# Patient Record
Sex: MALE | Race: WHITE | URBAN - METROPOLITAN AREA
[De-identification: names, ages, dates, MRNs, and addresses within clinical notes are randomized per-mention and may not be internally consistent; named-entity substitution may affect disease eponyms.]

---

## 2017-07-11 ENCOUNTER — OFFICE VISIT (OUTPATIENT)
Dept: DERMATOLOGY | Facility: CLINIC | Age: 27
End: 2017-07-11
Payer: COMMERCIAL

## 2017-07-11 DIAGNOSIS — Z80.8 FAMILY HISTORY OF MELANOMA: ICD-10-CM

## 2017-07-11 DIAGNOSIS — Z12.83 SKIN CANCER SCREENING: ICD-10-CM

## 2017-07-11 DIAGNOSIS — D22.9 MULTIPLE NEVI: ICD-10-CM

## 2017-07-11 DIAGNOSIS — L70.0 COMEDONAL ACNE: ICD-10-CM

## 2017-07-11 DIAGNOSIS — D48.5 NEOPLASM OF UNCERTAIN BEHAVIOR OF SKIN: Primary | ICD-10-CM

## 2017-07-11 DIAGNOSIS — L73.9 FOLLICULITIS: ICD-10-CM

## 2017-07-11 PROCEDURE — 88305 TISSUE EXAM BY PATHOLOGIST: CPT | Performed by: DERMATOLOGY

## 2017-07-11 PROCEDURE — 99203 OFFICE O/P NEW LOW 30 MIN: CPT | Mod: 25 | Performed by: DERMATOLOGY

## 2017-07-11 PROCEDURE — 11101 HC BIOPSY SKIN/SUBQ/MUC MEM, EACH ADDTL LESION: CPT | Performed by: DERMATOLOGY

## 2017-07-11 PROCEDURE — 11100 HC BIOPSY SKIN/SUBQ/MUC MEM, SINGLE LESION: CPT | Performed by: DERMATOLOGY

## 2017-07-11 RX ORDER — LIDOCAINE HYDROCHLORIDE AND EPINEPHRINE 10; 10 MG/ML; UG/ML
3 INJECTION, SOLUTION INFILTRATION; PERINEURAL ONCE
Qty: 0.5 ML | Refills: 0 | OUTPATIENT
Start: 2017-07-11 | End: 2017-07-11

## 2017-07-11 RX ORDER — BUPRENORPHINE HYDROCHLORIDE, NALOXONE HYDROCHLORIDE 8; 2 MG/1; MG/1
FILM, SOLUBLE BUCCAL; SUBLINGUAL
Refills: 0 | COMMUNITY
Start: 2017-06-14 | End: 2018-01-01

## 2017-07-11 NOTE — PROGRESS NOTES
"[facility] Dermatology Note      Dermatology Problem List:  1.Family hx of melanoma in pt's mother  2.NUB, right upper arm, shave bx 7/11/2017  3.NUB, right nasal ala, shave bx 7/11/2017  4.Folliculitis of the trunk, BPO 10% wash  5. Comedonal acne: face. BPO wash, Differin 0.1% gel qhs.    Last TBSE: 7/11/17    Encounter Date: Jul 11, 2017    CC:  Chief Complaint   Patient presents with     Derm Problem     mole check- arm, stomach and shoulder        History of Present Illness:  Mr. Robert Langley is a 27 year old male who presents for a mole check. He has moles of concern on his arm, abdomen, and shoulder. The one on his shoulder has not changed and is not pruritic, burning, or spontaneously bleeding. The other moles are not concerning him but he would like them removed for cosmetic reasons. The mole on his nose will gather \"grease and fat\" that he is able to squeeze out of it. There will be a hair that grows out of it and it is mildly pruritic. No other lesions of concern    He has used a soap bar in the shower but recently switched to a shower gel. He also recently purchased a loofa to better scrub his back.    He is currently recovering from a sun burn. He is not good with sun protection.    Past Medical History:   There is no problem list on file for this patient.    No past medical history on file.  No past surgical history on file.    Social History:  The patient works as an . The patient denies use of tanning beds.    Family History:  There is a family hx of melanoma in pt's mother.    Medications:  Current Outpatient Prescriptions   Medication Sig Dispense Refill     SUBOXONE 8-2 MG per film   0       No Known Allergies    Review of Systems:  -Skin/Heme New Pt: The patient admits to denies sun exposure. The patient denies excessive scarring or problems healing except as per HPI. The patient denies excessive bleeding.  -Constitutional: The patient denies fatigue, fevers, " chills, unintended weight loss, and night sweats.    Physical exam:  Vitals: There were no vitals taken for this visit.  GEN: This is a well developed, well-nourished male in no acute distress, in a pleasant mood.    NEURO: Alert and oriented  PSYCH: In pleasant mood, appropriate affect  SKIN: Total skin excluding the undergarment areas was performed. The exam included the head/face, neck, both arms, chest, back, abdomen, both legs, digits and/or nails.   -There are bright red some shaped papules scattered on examined surfaces.   -on the right upper arm is a pigmented papule with central asymmetric pigment and a peripheral clearing of pigment within the papule  -there is a firm skin colored papule on the right nasal ala measuring 3-4 mm in diameter  -Multiple regular brown pigmented macules and papules are identified on examined surfaces.   -Mild desquamation over the shoulders without redness.  -scattered open comedones mainly on forehead and across nose.  -No other lesions of concern on areas examined.     Impression/Plan:  1. Family history of Melanoma in mother/Skin cancer screening: aside form 2 biopsies below, no other skin concerns.    2. Neoplasm of uncertain behavior on the right upper arm. The differential diagnosis includes Dysplastic nevus vs nevus. Shave bx.  3. Neoplasm of uncertain behavior on the right nasal ala. The differential diagnosis includes nevus vs fibrous papule. Favor nevus. Shave bx.    x2 Shave biopsy:  After discussion of benefits and risks including but not limited to bleeding/bruising, pain/swelling, infection, scar, incomplete removal, nerve damage/numbness, recurrence, and non-diagnostic biopsy, written consent, verbal consent and photographs were obtained. Time-out was performed. The area was cleaned with isopropyl alcohol.  was injected to obtain adequate anesthesia of the lesion on the right upper arm and right nasal ala. 1 ml of 1% lidocaine with 1:100,000 epinephrine was  injected to obtain adequate anesthesia. A  shave biopsy was performed. Hemostasis was achieved with aluminium chloride. Vaseline and a sterile dressing were applied. The patient tolerated the procedure and no complications were noted. The patient was provided with verbal and written post care instructions.      4. Comedonal Acne of the face    OTC Differin 0.1% gel qhs taper up.    BPO 10% wash qoday.    5. Folliculitis on back. Mild.    Start OTC BPO 10% wash for the trunk, okay to use on face    6. Cherry angiomas and Multiple clinically benign nevi    No further intervention required. Patient to report changes.     Sun precaution was advised including the use of sun screens of SPF 30 or higher, sun protective clothing, and avoidance of tanning beds.      Follow-up prn for new or changing lesions, pending biopsy results. He doesn't need a yearly skin check yet.      Staff Involved:  Scribe/Staff    Scribe Disclosure:   I, Hebert Gurrola, am serving as a scribe to document services personally performed by Dr. Trent Aguilera, based on data collection and the provider's statements to me.     Provider Disclosure:   I have reviewed the documentation recorded by the scribe and have edited it as needed. I have personally performed the services documented here and the documentation accurately represents those services and the decisions made by me.     Trent Aguilera MD, MS    Department of Dermatology  [facility]: Phone: [phone], Fax:[phone]  [facility]: Phone: [phone], Fax: [phone]

## 2017-07-11 NOTE — PATIENT INSTRUCTIONS
Start over-the-counter benzoyl peroxide 10% wash on the Trunk.  If 10% is too irritating you can use the 5%. This medication can bleach your towels and clothing. This is easily found in the acne aisle. I probably would NOT use a deepa-like formulation as it is harder to use on the back. Do this every other day.    Differin (adapalene) Gel: start every 3rd night, increase by 1 night every week until you're using it every night as tolerated. This medication can cause dryness so make sure you moisturize regularly. Do not get it on your eyelids or in the eyes. This will help with the greasiness of the face. Takes 3 months to see a real difference. Use only at night. Dryness is the main side effect. You can use this 3 times a week if it is too drying for the face.    -----------------    Wound Care After a Biopsy    What is a skin biopsy?  A skin biopsy allows the doctor to examine a very small piece of tissue under the microscope to determine the diagnosis and the best treatment for the skin condition. A local anesthetic (numbing medicine)  is injected with a very small needle into the skin area to be tested. A small piece of skin is taken from the area. Sometimes a suture (stitch) is used.     What are the risks of a skin biopsy?  I will experience scar, bleeding, swelling, pain, crusting and redness. I may experience incomplete removal or recurrence. Risks of this procedure are excessive bleeding, bruising, infection, nerve damage, numbness, thick (hypertrophic or keloidal) scar and non-diagnostic biopsy.    How should I care for my wound for the first 24 hours?    Keep the wound dry and covered for 24 hours    If it bleeds, hold direct pressure on the area for 15 minutes. If bleeding does not stop then go to the emergency room    Avoid strenuous exercise the first 1-2 days or as your doctor instructs you    How should I care for the wound after 24 hours?    After 24 hours, remove the bandage    You may bathe or shower  as normal    If you had a scalp biopsy, you can shampoo as usual and can use shower water to clean the biopsy site daily    Clean the wound twice a day with gentle soap and water    Do not scrub, be gentle    Apply white petroleum/Vaseline after cleaning the wound with a cotton swab or a clean finger, and keep the site covered with a Bandaid /bandage. Bandages are not necessary with a scalp biopsy    If you are unable to cover the site with a Bandaid /bandage, re-apply ointment 2-3 times a day to keep the site moist. Moisture will help with healing    Avoid strenuous activity for first 1-2 days    Avoid lakes, rivers, pools, and oceans until the stitches are removed or the site is healed    How do I clean my wound?    Wash hands thoroughly with soap or use hand  before all wound care    Clean the wound with gentle soap and water    Apply white petroleum/Vaseline  to wound after it is clean    Replace the Bandaid /bandage to keep the wound covered for the first few days or as instructed by your doctor    If you had a scalp biopsy, warm shower water to the area on a daily basis should suffice    What should I use to clean my wound?     Cotton-tipped applicators (Qtips )    White petroleum jelly (Vaseline ). Use a clean new container and use Q-tips to apply.    Bandaids   as needed    Gentle soap     How should I care for my wound long term?    Do not get your wound dirty    Keep up with wound care for one week or until the area is healed.    A small scab will form and fall off by itself when the area is completely healed. The area will be red and will become pink in color as it heals. Sun protection is very important for how your scar will turn out. Sunscreen with an SPF 30 or greater is recommended once the area is healed.    You should have some soreness but it should be mild and slowly go away over several days. Talk to your doctor about using tylenol for pain,    When should I call my doctor?  If you have  increased:     Pain or swelling    Pus or drainage (clear or slightly yellow drainage is ok)    Temperature over 100F    Spreading redness or warmth around wound    When will I hear about my results?  The biopsy results can take 2-3 weeks to come back. The clinic will call you with the results, send you a [facility] message, or have you schedule a follow-up clinic or phone time to discuss the results. Contact our clinics if you do not hear from us in 3 weeks.     Who should I call with questions?    [facility]: [phone]     [facility]: [phone]    For urgent needs outside of business hours call the [facility] at [phone] and ask for the dermatology resident on call    --------------------------

## 2017-07-11 NOTE — LETTER
"7/11/2017       RE: Robert Langley  [address] [place] [address]     Dear Colleague,    Thank you for referring your patient, Robert Langley, to the [facility]. Please see a copy of my visit note below.    [facility] Dermatology Note      Dermatology Problem List:  1.Family hx of melanoma in pt's mother  2.NUB, right upper arm, shave bx 7/11/2017  3.NUB, right nasal ala, shave bx 7/11/2017  4.Folliculitis of the trunk, BPO 10% wash  5. Comedonal acne: face. BPO wash, Differin 0.1% gel qhs.    Last TBSE: 7/11/17    Encounter Date: Jul 11, 2017    CC:  Chief Complaint   Patient presents with     Derm Problem     mole check- arm, stomach and shoulder        History of Present Illness:  Mr. Robert Langley is a 27 year old male who presents for a mole check. He has moles of concern on his arm, abdomen, and shoulder. The one on his shoulder has not changed and is not pruritic, burning, or spontaneously bleeding. The other moles are not concerning him but he would like them removed for cosmetic reasons. The mole on his nose will gather \"grease and fat\" that he is able to squeeze out of it. There will be a hair that grows out of it and it is mildly pruritic. No other lesions of concern    He has used a soap bar in the shower but recently switched to a shower gel. He also recently purchased a loofa to better scrub his back.    He is currently recovering from a sun burn. He is not good with sun protection.    Past Medical History:   There is no problem list on file for this patient.    No past medical history on file.  No past surgical history on file.    Social History:  The patient works as an . The patient denies use of tanning beds.    Family History:  There is a family hx of melanoma in pt's mother.    Medications:  Current Outpatient Prescriptions   Medication Sig Dispense Refill     SUBOXONE 8-2 MG " per film   0       No Known Allergies    Review of Systems:  -Skin/Heme New Pt: The patient admits to denies sun exposure. The patient denies excessive scarring or problems healing except as per HPI. The patient denies excessive bleeding.  -Constitutional: The patient denies fatigue, fevers, chills, unintended weight loss, and night sweats.    Physical exam:  Vitals: There were no vitals taken for this visit.  GEN: This is a well developed, well-nourished male in no acute distress, in a pleasant mood.    NEURO: Alert and oriented  PSYCH: In pleasant mood, appropriate affect  SKIN: Total skin excluding the undergarment areas was performed. The exam included the head/face, neck, both arms, chest, back, abdomen, both legs, digits and/or nails.   -There are bright red some shaped papules scattered on examined surfaces.   -on the right upper arm is a pigmented papule with central asymmetric pigment and a peripheral clearing of pigment within the papule  -there is a firm skin colored papule on the right nasal ala measuring 3-4 mm in diameter  -Multiple regular brown pigmented macules and papules are identified on examined surfaces.   -Mild desquamation over the shoulders without redness.  -scattered open comedones mainly on forehead and across nose.  -No other lesions of concern on areas examined.     Impression/Plan:  1. Family history of Melanoma in mother/Skin cancer screening: aside form 2 biopsies below, no other skin concerns.    2. Neoplasm of uncertain behavior on the right upper arm. The differential diagnosis includes Dysplastic nevus vs nevus. Shave bx.  3. Neoplasm of uncertain behavior on the right nasal ala. The differential diagnosis includes nevus vs fibrous papule. Favor nevus. Shave bx.    x2 Shave biopsy:  After discussion of benefits and risks including but not limited to bleeding/bruising, pain/swelling, infection, scar, incomplete removal, nerve damage/numbness, recurrence, and non-diagnostic biopsy,  written consent, verbal consent and photographs were obtained. Time-out was performed. The area was cleaned with isopropyl alcohol.  was injected to obtain adequate anesthesia of the lesion on the right upper arm and right nasal ala. 1 ml of 1% lidocaine with 1:100,000 epinephrine was injected to obtain adequate anesthesia. A  shave biopsy was performed. Hemostasis was achieved with aluminium chloride. Vaseline and a sterile dressing were applied. The patient tolerated the procedure and no complications were noted. The patient was provided with verbal and written post care instructions.      4. Comedonal Acne of the face    OTC Differin 0.1% gel qhs taper up.    BPO 10% wash qoday.    5. Folliculitis on back. Mild.    Start OTC BPO 10% wash for the trunk, okay to use on face    6. Cherry angiomas and Multiple clinically benign nevi    No further intervention required. Patient to report changes.     Sun precaution was advised including the use of sun screens of SPF 30 or higher, sun protective clothing, and avoidance of tanning beds.      Follow-up prn for new or changing lesions, pending biopsy results. He doesn't need a yearly skin check yet.      Staff Involved:  Scribe/Staff    Scribe Disclosure:   I, Hebert Gurrola, am serving as a scribe to document services personally performed by Dr. Trent Aguilera, based on data collection and the provider's statements to me.     Provider Disclosure:   I have reviewed the documentation recorded by the scribe and have edited it as needed. I have personally performed the services documented here and the documentation accurately represents those services and the decisions made by me.     Trent Aguilera MD, MS    Department of Dermatology  [facility]: Phone: [phone], Fax:[phone]  [facility]: Phone: [phone], Fax: [phone]          Again, thank you  for allowing me to participate in the care of your patient.      Sincerely,    Trent Aguilera MD

## 2017-07-11 NOTE — MR AVS SNAPSHOT
After Visit Summary   7/11/2017    Robert Langley    MRN: [id number]           Patient Information     Date Of Birth          1990        Visit Information        Provider Department      7/11/2017 11:30 AM Trent Aguilera MD [facility]        Today's Diagnoses     Neoplasm of uncertain behavior of skin    -  1    Folliculitis        Multiple nevi        Family history of melanoma        Comedonal acne          Care Instructions    Start over-the-counter benzoyl peroxide 10% wash on the Trunk.  If 10% is too irritating you can use the 5%. This medication can bleach your towels and clothing. This is easily found in the acne aisle. I probably would NOT use a deepa-like formulation as it is harder to use on the back. Do this every other day.    Differin (adapalene) Gel: start every 3rd night, increase by 1 night every week until you're using it every night as tolerated. This medication can cause dryness so make sure you moisturize regularly. Do not get it on your eyelids or in the eyes. This will help with the greasiness of the face. Takes 3 months to see a real difference. Use only at night. Dryness is the main side effect. You can use this 3 times a week if it is too drying for the face.    -----------------    Wound Care After a Biopsy    What is a skin biopsy?  A skin biopsy allows the doctor to examine a very small piece of tissue under the microscope to determine the diagnosis and the best treatment for the skin condition. A local anesthetic (numbing medicine)  is injected with a very small needle into the skin area to be tested. A small piece of skin is taken from the area. Sometimes a suture (stitch) is used.     What are the risks of a skin biopsy?  I will experience scar, bleeding, swelling, pain, crusting and redness. I may experience incomplete removal or recurrence. Risks of this procedure are excessive bleeding, bruising, infection, nerve damage, numbness, thick  (hypertrophic or keloidal) scar and non-diagnostic biopsy.    How should I care for my wound for the first 24 hours?    Keep the wound dry and covered for 24 hours    If it bleeds, hold direct pressure on the area for 15 minutes. If bleeding does not stop then go to the emergency room    Avoid strenuous exercise the first 1-2 days or as your doctor instructs you    How should I care for the wound after 24 hours?    After 24 hours, remove the bandage    You may bathe or shower as normal    If you had a scalp biopsy, you can shampoo as usual and can use shower water to clean the biopsy site daily    Clean the wound twice a day with gentle soap and water    Do not scrub, be gentle    Apply white petroleum/Vaseline after cleaning the wound with a cotton swab or a clean finger, and keep the site covered with a Bandaid /bandage. Bandages are not necessary with a scalp biopsy    If you are unable to cover the site with a Bandaid /bandage, re-apply ointment 2-3 times a day to keep the site moist. Moisture will help with healing    Avoid strenuous activity for first 1-2 days    Avoid lakes, rivers, pools, and oceans until the stitches are removed or the site is healed    How do I clean my wound?    Wash hands thoroughly with soap or use hand  before all wound care    Clean the wound with gentle soap and water    Apply white petroleum/Vaseline  to wound after it is clean    Replace the Bandaid /bandage to keep the wound covered for the first few days or as instructed by your doctor    If you had a scalp biopsy, warm shower water to the area on a daily basis should suffice    What should I use to clean my wound?     Cotton-tipped applicators (Qtips )    White petroleum jelly (Vaseline ). Use a clean new container and use Q-tips to apply.    Bandaids   as needed    Gentle soap     How should I care for my wound long term?    Do not get your wound dirty    Keep up with wound care for one week or until the area is  healed.    A small scab will form and fall off by itself when the area is completely healed. The area will be red and will become pink in color as it heals. Sun protection is very important for how your scar will turn out. Sunscreen with an SPF 30 or greater is recommended once the area is healed.    You should have some soreness but it should be mild and slowly go away over several days. Talk to your doctor about using tylenol for pain,    When should I call my doctor?  If you have increased:     Pain or swelling    Pus or drainage (clear or slightly yellow drainage is ok)    Temperature over 100F    Spreading redness or warmth around wound    When will I hear about my results?  The biopsy results can take 2-3 weeks to come back. The clinic will call you with the results, send you a mychart message, or have you schedule a follow-up clinic or phone time to discuss the results. Contact our clinics if you do not hear from us in 3 weeks.     Who should I call with questions?    [facility]: [phone]     [facility]: [phone]    For urgent needs outside of business hours call the [facility] at [phone] and ask for the dermatology resident on call    --------------------------                            Follow-ups after your visit        Who to contact     If you have questions or need follow up information about today's clinic visit or your schedule please contact [facility] directly at [phone].  Normal or non-critical lab and imaging results will be communicated to you by MyChart, letter or phone within 4 business days after the clinic has received the results. If you do not hear from us within 7 days, please contact the clinic through [employer] or phone. If you have a critical or abnormal lab result, we will notify you by phone as soon as possible.  Submit refill requests through [employer] or call your pharmacy and they  will forward the refill request to us. Please allow 3 business days for your refill to be completed.          Additional Information About Your Visit        [employer] Information     [employer] is an electronic gateway that provides easy, online access to your medical records. With [employer], you can request a clinic appointment, read your test results, renew a prescription or communicate with your care team.     To sign up for [employer] visit the website at [email or web address]   You will be asked to enter the access code listed below, as well as some personal information. Please follow the directions to create your username and password.     Your access code is: [email or web address]  Expires: 10/9/2017 12:02 PM     Your access code will  in 90 days. If you need help or a new code, please contact your [facility] or call [phone] for assistance.        Care EveryWhere ID     This is your Care EveryWhere ID. This could be used by other organizations to access your [facility] medical records  [id number]         Blood Pressure from Last 3 Encounters:   01/04/10 115/67    Weight from Last 3 Encounters:   01/04/10 59 kg (130 lb) (16 %)*     * Growth percentiles are based on [facility] 2-20 Years data.              We Performed the Following     BIOPSY SKIN/SUBQ/MUC MEM, EACH ADDTL LESION     BIOPSY SKIN/SUBQ/MUC MEM, SINGLE LESION     Surgical pathology exam          Today's Medication Changes          These changes are accurate as of: 17 12:02 PM.  If you have any questions, ask your nurse or doctor.               Start taking these medicines.        Dose/Directions    lidocaine 1% with EPINEPHrine 1:100,000 1 %-1:131894 injection   Used for:  Neoplasm of uncertain behavior of skin, Family history of melanoma        Dose:  3 mL   Inject 3 mLs into the skin once for 1 dose   Quantity:  0.5 mL   Refills:  0            Where to get your medicines      Some of these will need a paper  prescription and others can be bought over the counter.  Ask your nurse if you have questions.     You don't need a prescription for these medications     lidocaine 1% with EPINEPHrine 1:100,000 1 %-1:113330 injection                Primary Care Provider    [facility]       No address on file        Equal Access to Services     CHEYANNEARTURO JULIETA : Hadii aad ku hadskyenj Sonaeemali, waaxda luqadaha, qaybta kaalmada adeegyada, phi reciomelbamiguel shaikh. So [facility] [phone].    ATENCIÓN: Si habla español, tiene a prather disposición servicios gratuitos de asistencia lingüística. Llame al [phone].    We comply with applicable federal civil rights laws and [place] laws. We do not discriminate on the basis of race, color, national origin, age, disability sex, sexual orientation or gender identity.            Thank you!     Thank you for choosing [facility]  for your care. Our goal is always to provide you with excellent care. Hearing back from our patients is one way we can continue to improve our services. Please take a few minutes to complete the written survey that you may receive in the mail after your visit with us. Thank you!             Your Updated Medication List - Protect others around you: Learn how to safely use, store and throw away your medicines at www.disposemymeds.org.          This list is accurate as of: 7/11/17 12:02 PM.  Always use your most recent med list.                   Brand Name Dispense Instructions for use Diagnosis    lidocaine 1% with EPINEPHrine 1:100,000 1 %-1:701028 injection     0.5 mL    Inject 3 mLs into the skin once for 1 dose    Neoplasm of uncertain behavior of skin, Family history of melanoma       SUBOXONE 8-2 MG per film   Generic drug:  buprenorphine HCl-naloxone HCl

## 2017-07-11 NOTE — NURSING NOTE
Dermatology Rooming Note    Robert Langley's goals for this visit include:   Chief Complaint   Patient presents with     Derm Problem     mole check- arm, stomach and shoulder        Is a scribe okay for this visit:YES    Are records needed for this visit(If yes, obtain release of information): No     Vitals: There were no vitals taken for this visit.    Referring Provider:  Referred Self, MD  No address on file

## 2017-07-14 ENCOUNTER — TELEPHONE (OUTPATIENT)
Dept: DERMATOLOGY | Facility: CLINIC | Age: 27
End: 2017-07-14

## 2017-07-14 LAB — COPATH REPORT: NORMAL

## 2017-07-14 NOTE — PROGRESS NOTES
Please let Mr Langley know the followin) Right upper arm: normal mole with a scar, which is why it looked funny.  2) Right side of nose: normal mole.    Trent Aguilera MD, MS    Department of Dermatology  [facility]: Phone: [phone], Fax:[phone]  [facility]: Phone: [phone], Fax: [phone]

## 2017-07-14 NOTE — TELEPHONE ENCOUNTER
Notes Recorded by Watson Mullen MA on 2017 at 2:26 PM  Talked with patient, this cma went through results, patient had no questions or concerns at this time.     Watson Mullen CMA     ------    Notes Recorded by Trent Aguilera MD on 2017 at 2:00 PM  Please let Mr Langley know the followin) Right upper arm: normal mole with a scar, which is why it looked funny.  2) Right side of nose: normal mole.    Trent Aguilera MD, MS    Department of Dermatology  [facility]: Phone: [phone], Fax:[phone]  [facility]: Phone: [phone], Fax: [phone]          3d ago       Copath Report Patient Name: JANUSZ LANGLEY   MR#: [id number]   Specimen #: [id number]   Collected: 2017   Received: 2017   Reported: 2017 09:35   Ordering Phy(s): TRENT AGUILERA     For improved result formatting, select 'View Enhanced Report Format'   under Linked Documents section.     SPECIMEN(S):   A: Skin, right upper arm   B: Skin, right nasal ala     FINAL DIAGNOSIS:   A. Right upper arm:   - Intradermal melanocytic nevus with congenital features - (see   comment).     B. Right nasal ala:   - Intradermal melanocytic nevus - (see description).     COMMENT:   A. The specimen exhibits papillary dermal fibrosis suggesting prior   trauma.

## 2018-01-01 ENCOUNTER — TELEPHONE (OUTPATIENT)
Dept: FAMILY MEDICINE | Facility: CLINIC | Age: 28
End: 2018-01-01

## 2018-01-01 ENCOUNTER — OFFICE VISIT (OUTPATIENT)
Dept: FAMILY MEDICINE | Facility: CLINIC | Age: 28
End: 2018-01-01
Payer: COMMERCIAL

## 2018-01-01 VITALS
WEIGHT: 165.8 LBS | HEIGHT: 68 IN | RESPIRATION RATE: 16 BRPM | TEMPERATURE: 98.2 F | DIASTOLIC BLOOD PRESSURE: 84 MMHG | BODY MASS INDEX: 25.13 KG/M2 | SYSTOLIC BLOOD PRESSURE: 124 MMHG | OXYGEN SATURATION: 98 % | HEART RATE: 87 BPM

## 2018-01-01 DIAGNOSIS — F17.200 TOBACCO USE DISORDER: ICD-10-CM

## 2018-01-01 DIAGNOSIS — F11.10 OPIATE ABUSE, EPISODIC (H): ICD-10-CM

## 2018-01-01 DIAGNOSIS — F11.91 HISTORY OF HEROIN USE: ICD-10-CM

## 2018-01-01 DIAGNOSIS — F12.11 HISTORY OF CANNABIS ABUSE: ICD-10-CM

## 2018-01-01 DIAGNOSIS — F41.0 PANIC ATTACK: Primary | ICD-10-CM

## 2018-01-01 DIAGNOSIS — F41.0 PANIC ATTACK: ICD-10-CM

## 2018-01-01 DIAGNOSIS — F41.9 ANXIETY: ICD-10-CM

## 2018-01-01 DIAGNOSIS — F11.20 PATIENT ON METHADONE MAINTENANCE THERAPY (H): ICD-10-CM

## 2018-01-01 PROCEDURE — 99204 OFFICE O/P NEW MOD 45 MIN: CPT | Performed by: NURSE PRACTITIONER

## 2018-01-01 RX ORDER — BUSPIRONE HYDROCHLORIDE 5 MG/1
TABLET ORAL
Qty: 150 TABLET | Refills: 1 | Status: SHIPPED | OUTPATIENT
Start: 2018-01-01

## 2018-01-01 RX ORDER — CLONAZEPAM 0.5 MG/1
0.25-0.5 TABLET ORAL 2 TIMES DAILY PRN
Qty: 20 TABLET | Refills: 0 | Status: SHIPPED | OUTPATIENT
Start: 2018-01-01

## 2018-01-01 RX ORDER — CLONAZEPAM 0.5 MG/1
0.25-0.5 TABLET ORAL 2 TIMES DAILY PRN
Qty: 20 TABLET | Refills: 0 | Status: SHIPPED | OUTPATIENT
Start: 2018-01-01 | End: 2018-01-01

## 2018-01-01 ASSESSMENT — ANXIETY QUESTIONNAIRES
5. BEING SO RESTLESS THAT IT IS HARD TO SIT STILL: NEARLY EVERY DAY
1. FEELING NERVOUS, ANXIOUS, OR ON EDGE: NEARLY EVERY DAY
GAD7 TOTAL SCORE: 18
6. BECOMING EASILY ANNOYED OR IRRITABLE: SEVERAL DAYS
3. WORRYING TOO MUCH ABOUT DIFFERENT THINGS: NEARLY EVERY DAY
2. NOT BEING ABLE TO STOP OR CONTROL WORRYING: NEARLY EVERY DAY
IF YOU CHECKED OFF ANY PROBLEMS ON THIS QUESTIONNAIRE, HOW DIFFICULT HAVE THESE PROBLEMS MADE IT FOR YOU TO DO YOUR WORK, TAKE CARE OF THINGS AT HOME, OR GET ALONG WITH OTHER PEOPLE: EXTREMELY DIFFICULT
7. FEELING AFRAID AS IF SOMETHING AWFUL MIGHT HAPPEN: MORE THAN HALF THE DAYS
GAD7 TOTAL SCORE: 18

## 2018-01-01 ASSESSMENT — PATIENT HEALTH QUESTIONNAIRE - PHQ9
5. POOR APPETITE OR OVEREATING: NEARLY EVERY DAY
SUM OF ALL RESPONSES TO PHQ QUESTIONS 1-9: 7

## 2018-12-06 NOTE — PROGRESS NOTES
SUBJECTIVE:   Robert Langley is a 28 year old male who presents to clinic today for the following health issues:    [place]  website checked    Abnormal Mood Symptoms  Onset: ongoing for years-worsened in the last year    Description:   Depression: no  Anxiety: YES  Panic attacks- YES    Accompanying Signs & Symptoms:  Still participating in activities that you used to enjoy: YES  Fatigue: YES  Irritability: no  Difficulty concentrating: YES  Changes in appetite: no  Problems with sleep: no  Heart racing/beating fast : YES  Thoughts of hurting yourself or others: none    History:   Recent stress: YES  Prior depression hospitalization: None  Family history of depression: YES  Family history of anxiety: no    Precipitating factors:   Alcohol/drug use: YES- mathadone  Therapies Tried and outcome: None      Problem list and histories reviewed & adjusted, as indicated.  Additional history: as documented    Patient Active Problem List   Diagnosis     History of cannabis abuse     History of heroin use     Patient on methadone maintenance therapy (H)     Anxiety     Tobacco use disorder     Panic attack     History reviewed. No pertinent surgical history.    Social History   Substance Use Topics     Smoking status: Former Smoker     Years: 3.00     Types: Cigarettes     Quit date: 1/3/2010     Smokeless tobacco: Never Used     Alcohol use No     History reviewed. No pertinent family history.      Current Outpatient Prescriptions   Medication Sig Dispense Refill     busPIRone (BUSPAR) 5 MG tablet Start at 5 mg twice daily for 3 days, then 7.5 mg (1.5 tabs) twice daily for 3 days, then 10 mg (2 tabs) twice daily for 3 days, then 12.5 mg (2.5 tabs) twice daily for 3 days, then 15 mg (3 tabs) twice daily and stay at that dose 150 tablet 1     clonazePAM (KLONOPIN) 0.5 MG tablet Take 0.5-1 tablets (0.25-0.5 mg) by mouth 2 times daily as needed for anxiety (use sparingly, habit forming medication) 20 tablet 0     METHADONE  "HCL PO Take 37 mg by mouth       No Known Allergies  No lab results found.   BP Readings from Last 3 Encounters:   12/07/18 124/84   01/04/10 115/67    Wt Readings from Last 3 Encounters:   12/07/18 165 lb 12.8 oz (75.2 kg)   01/04/10 130 lb (59 kg) (16 %)*     * Growth percentiles are based on [facility] 2-20 Years data.                  Labs reviewed in EPIC    Reviewed and updated as needed this visit by clinical staff  Tobacco  Allergies  Meds  Problems  Med Hx  Surg Hx  Fam Hx  Soc Hx        Reviewed and updated as needed this visit by Provider  Allergies  Meds  Problems         ROS:  Constitutional, HEENT, cardiovascular, pulmonary, GI, , musculoskeletal, neuro, skin, endocrine and psych systems are negative, except as otherwise noted.    OBJECTIVE:     /84  Pulse 87  Temp 98.2  F (36.8  C) (Oral)  Resp 16  Ht 5' 8\" (1.727 m)  Wt 165 lb 12.8 oz (75.2 kg)  SpO2 98%  BMI 25.21 kg/m2  Body mass index is 25.21 kg/(m^2).  GENERAL: healthy, alert and no distress  NECK: no adenopathy, no asymmetry, masses, or scars and thyroid normal to palpation  RESP: lungs clear to auscultation - no rales, rhonchi or wheezes  CV: regular rate and rhythm, normal S1 S2, no S3 or S4, no murmur, click or rub, no peripheral edema and peripheral pulses strong  PSYCH: mentation appears normal, POSITIVE for appears anxious, red in face    Diagnostic Test Results:  See orders    Patient requesting benzo for anxiety as this is what he used in the past.  Patient reports that he has not misused benzo's in the past.  Discussed that that is a temporary answer until we get a daily anxiety medication working; such as buspar for anxiety.  Pt reluctant to try daily medication but is willing. Discussed risk of abuse and to use sparingly.  Minimal benzos prescribed.     ASSESSMENT/PLAN:         ICD-10-CM    1. Panic attack F41.0 clonazePAM (KLONOPIN) 0.5 MG tablet     MENTAL HEALTH REFERRAL  - Adult; Psychiatry and Medication " Management, Outpatient Treatment; Individual/Couples/Family/Group Therapy/Health Psychology; [facility]: [facility] [phone]; We will contact you to schedule the appointmen...   2. Tobacco use disorder F17.200 TOBACCO CESSATION - FOR HEALTH MAINTENANCE   3. Anxiety F41.9 busPIRone (BUSPAR) 5 MG tablet     MENTAL HEALTH REFERRAL  - Adult; Psychiatry and Medication Management, Outpatient Treatment; Individual/Couples/Family/Group Therapy/Health Psychology; [facility] [phone]; We will contact you to schedule the appointmen...   4. Patient on methadone maintenance therapy (H) F11.20 MENTAL HEALTH REFERRAL  - Adult; Psychiatry and Medication Management, Outpatient Treatment; Individual/Couples/Family/Group Therapy/Health Psychology; [facility] [phone]; We will contact you to schedule the appointmen...   5. History of heroin use Z86.59    6. History of cannabis abuse Z87.898        See Patient Instructions: Benzos are not the answer to anxiety treatment, we can use them very briefly until lthe buspar starts working.  Please schedule with a therapist and psychiatrist. Follow up in one month for medication check, sooner if needed.     Jessica Mathews, [facility]

## 2018-12-07 PROBLEM — F41.0 PANIC ATTACK: Status: ACTIVE | Noted: 2018-01-01

## 2018-12-07 PROBLEM — F12.11 HISTORY OF CANNABIS ABUSE: Status: ACTIVE | Noted: 2018-01-01

## 2018-12-07 PROBLEM — F11.91 HISTORY OF HEROIN USE: Status: ACTIVE | Noted: 2018-01-01

## 2018-12-07 PROBLEM — F11.20 PATIENT ON METHADONE MAINTENANCE THERAPY (H): Status: ACTIVE | Noted: 2018-01-01

## 2018-12-07 PROBLEM — F11.10 OPIATE ABUSE, EPISODIC (H): Status: ACTIVE | Noted: 2018-01-01

## 2018-12-07 PROBLEM — F41.9 ANXIETY: Status: ACTIVE | Noted: 2018-01-01

## 2018-12-07 PROBLEM — F17.200 TOBACCO USE DISORDER: Status: ACTIVE | Noted: 2018-01-01

## 2018-12-07 NOTE — MR AVS SNAPSHOT
"              After Visit Summary   12/7/2018    Robert Langley    MRN: [id number]           Patient Information     Date Of Birth          1990        Visit Information        Provider Department      12/7/2018 7:20 AM Jessica Mathews NP [facility] [place]        Today's Diagnoses     Panic attack    -  1    Tobacco use disorder        Anxiety        History of methadone use (H)          Care Instructions    Benzos are not the answer to anxiety treatment, we can use them very briefly until lthe buspar starts working.  Please schedule with a therapist and psychiatrist. Follow up in one month for medication check, sooner if needed.   Mental Health Crisis Numbers  [facility]  If you have a mental health or substance abuse crisis on a weekend or after hours, please use any of the resources below.  General numbers  911 emergency services  211 First Call for Help  [facility]  [address] [place] [address]  [phone]  Crisis Connection Hotline  301.723.8438 or 1-674.122.9294  National Suicide Prevention Lifeline  1-400-077-TALK (1005)  GRO1TBSF  Text crisis line for teens. Text \"LIFE\" to 637562  [facility] mobile crisis services  These services will come to you. Call the county where the child is physically located.    [place] (adult only): [phone]    [place] (child and adult): [phone]    [place] (child and adult): [phone]    [place] (child): [phone]    [place] (adult): [phone]    [place] (child): [phone]    [place] (Adult): [phone]    [place] (child and adult): [phone]    [place] (child and adult): [phone] or [phone]  Other crisis resources (not mobile)  [facility]  5-038-714-9321  [place] Youth Crisis Hotline  298.169.5370 or 1-289.724.6411  Acute Psychiatric Services (formerly known " as the Crisis Intervention Center)  Offers walk-in and telephone crisis intervention services for adults.  [facility]  [address] [place], [place] [address] [phone] or 612-270-3196 (suicide hotline)  Short-term residential crisis resources  [facility] (ages 10 to 17)  [address] [place] [address] [phone]  Suggested inpatient hospitalization   [facility] [place]  [address] [place] [address]  [phone]  For informational purposes only. Not to replace the advice of your health care provider.  Copyright   2014 [facility]. All rights reserved. [place] 748796 - REV 09/15.            Follow-ups after your visit        Additional Services     MENTAL HEALTH REFERRAL  - Adult; Psychiatry and Medication Management, Outpatient Treatment; Individual/Couples/Family/Group Therapy/Health Psychology; [facility]: [facility] [phone]; We will contact you to schedule the appointmen...       All scheduling is subject to the client's specific insurance plan & benefits, provider/location availability, and provider clinical specialities.  Please arrive 15 minutes early for your first appointment and bring your completed paperwork.    Please be aware that coverage of these services is subject to the terms and limitations of your health insurance plan.  Call member services at your health plan with any benefit or coverage questions.                            Follow-up notes from your care team     Return in about 4 weeks (around 1/4/2019), or if symptoms worsen or fail to improve.      Who to contact     Normal or non-critical lab and imaging results will be communicated to you by MyChart, letter or phone within 4 business days after the clinic has received the results. If you do not hear from us within 7 days, please contact the clinic through MyChart or phone. If you have a critical or  "abnormal lab result, we will notify you by phone as soon as possible.  Submit refill requests through [employer] or call your pharmacy and they will forward the refill request to us. Please allow 3 business days for your refill to be completed.          If you need to speak with a  for additional information , please call: [phone]             Additional Information About Your Visit        [employer] Information     [employer] gives you secure access to your electronic health record. If you see a primary care provider, you can also send messages to your care team and make appointments. If you have questions, please call your primary care clinic.  If you do not have a primary care provider, please call [phone] and they will assist you.        Care EveryWhere ID     This is your Care EveryWhere ID. This could be used by other organizations to access your [facility] medical records  [id number]        Your Vitals Were     Pulse Temperature Respirations Height Pulse Oximetry BMI (Body Mass Index)    87 98.2  F (36.8  C) (Oral) 16 5' 8\" (1.727 m) 98% 25.21 kg/m2       Blood Pressure from Last 3 Encounters:   12/07/18 144/84   01/04/10 115/67    Weight from Last 3 Encounters:   12/07/18 165 lb 12.8 oz (75.2 kg)   01/04/10 130 lb (59 kg) (16 %)*     * Growth percentiles are based on [facility] 2-20 Years data.              We Performed the Following     MENTAL HEALTH REFERRAL  - Adult; Psychiatry and Medication Management, Outpatient Treatment; Individual/Couples/Family/Group Therapy/Health Psychology; [facility] [phone]; We will contact you to schedule the appointmen...     TOBACCO CESSATION - FOR HEALTH MAINTENANCE          Today's Medication Changes          These changes are accurate as of 12/7/18  7:58 AM.  If you have any questions, ask your nurse or doctor.               Start taking these medicines.        Dose/Directions    busPIRone 5 MG tablet   Commonly known as:  BUSPAR "   Used for:  Anxiety   Started by:  Jessica Mathews NP        Start at 5 mg twice daily for 3 days, then 7.5 mg (1.5 tabs) twice daily for 3 days, then 10 mg (2 tabs) twice daily for 3 days, then 12.5 mg (2.5 tabs) twice daily for 3 days, then 15 mg (3 tabs) twice daily and stay at that dose   Quantity:  150 tablet   Refills:  1       clonazePAM 0.5 MG tablet   Commonly known as:  klonoPIN   Used for:  Panic attack   Started by:  Jessica Mathews NP        Dose:  0.25-0.5 mg   Take 0.5-1 tablets (0.25-0.5 mg) by mouth 2 times daily as needed for anxiety (use sparingly, habit forming medication)   Quantity:  20 tablet   Refills:  0         Stop taking these medicines if you haven't already. Please contact your care team if you have questions.     SUBOXONE 8-2 MG per film   Generic drug:  buprenorphine HCl-naloxone HCl   Stopped by:  Jessica Mathews NP                Where to get your medicines      Some of these will need a paper prescription and others can be bought over the counter.  Ask your nurse if you have questions.     Bring a paper prescription for each of these medications     busPIRone 5 MG tablet    clonazePAM 0.5 MG tablet               Information about OPIOIDS     PRESCRIPTION OPIOIDS: WHAT YOU NEED TO KNOW   We gave you an opioid (narcotic) pain medicine. It is important to manage your pain, but opioids are not always the best choice. You should first try all the other options your care team gave you. Take this medicine for as short a time (and as few doses) as possible.    Some activities can increase your pain, such as bandage changes or therapy sessions. It may help to take your pain medicine 30 to 60 minutes before these activities. Reduce your stress by getting enough sleep, working on hobbies you enjoy and practicing relaxation or meditation. Talk to your care team about ways to manage your pain beyond prescription opioids.    These medicines have risks:    DO NOT drive when on new or higher  doses of pain medicine. These medicines can affect your alertness and reaction times, and you could be arrested for driving under the influence (DUI). If you need to use opioids long-term, talk to your care team about driving.    DO NOT operate heavy machinery    DO NOT do any other dangerous activities while taking these medicines.    DO NOT drink any alcohol while taking these medicines.     If the opioid prescribed includes acetaminophen, DO NOT take with any other medicines that contain acetaminophen. Read all labels carefully. Look for the word  acetaminophen  or  Tylenol.  Ask your pharmacist if you have questions or are unsure.    You can get addicted to pain medicines, especially if you have a history of addiction (chemical, alcohol or substance dependence). Talk to your care team about ways to reduce this risk.    All opioids tend to cause constipation. Drink plenty of water and eat foods that have a lot of fiber, such as fruits, vegetables, prune juice, apple juice and high-fiber cereal. Take a laxative (Miralax, milk of magnesia, Colace, Senna) if you don t move your bowels at least every other day. Other side effects include upset stomach, sleepiness, dizziness, throwing up, tolerance (needing more of the medicine to have the same effect), physical dependence and slowed breathing.    Store your pills in a secure place, locked if possible. We will not replace any lost or stolen medicine. If you don t finish your medicine, please throw away (dispose) as directed by your pharmacist. The [place] [employer]ution [employer] has more information about safe disposal: https://www.pca.[facility].[place].us/living-green/managing-unwanted-medications         Primary Care Provider Office Phone # Fax #    Jessica Mathews [phone]       [address] [facility] [address]        Equal Access to Services     CHARLI RENDON AH: neel Lawson qaybta kaalmada adeegyada, waxay  meenakshi obrienjuan m hemaljaime diasaan ah. So [facility] [phone].    ATENCIÓN: Si shahbazla ben, tiene a prather disposición servicios gratuitos de asistencia lingüística. Chloe al [phone].    We comply with applicable federal civil rights laws and [place] laws. We do not discriminate on the basis of race, color, national origin, age, disability, sex, sexual orientation, or gender identity.            Thank you!     Thank you for choosing [facility]  for your care. Our goal is always to provide you with excellent care. Hearing back from our patients is one way we can continue to improve our services. Please take a few minutes to complete the written survey that you may receive in the mail after your visit with us. Thank you!             Your Updated Medication List - Protect others around you: Learn how to safely use, store and throw away your medicines at www.disposemymeds.org.          This list is accurate as of 12/7/18  7:58 AM.  Always use your most recent med list.                   Brand Name Dispense Instructions for use Diagnosis    busPIRone 5 MG tablet    BUSPAR    150 tablet    Start at 5 mg twice daily for 3 days, then 7.5 mg (1.5 tabs) twice daily for 3 days, then 10 mg (2 tabs) twice daily for 3 days, then 12.5 mg (2.5 tabs) twice daily for 3 days, then 15 mg (3 tabs) twice daily and stay at that dose    Anxiety       clonazePAM 0.5 MG tablet    klonoPIN    20 tablet    Take 0.5-1 tablets (0.25-0.5 mg) by mouth 2 times daily as needed for anxiety (use sparingly, habit forming medication)    Panic attack       METHADONE HCL PO      Take 37 mg by mouth

## 2018-12-07 NOTE — NURSING NOTE
"Chief Complaint   Patient presents with     Anxiety       Initial /84  Pulse 87  Temp 98.2  F (36.8  C) (Oral)  Resp 16  Ht 5' 8\" (1.727 m)  Wt 165 lb 12.8 oz (75.2 kg)  SpO2 98%  BMI 25.21 kg/m2 Estimated body mass index is 25.21 kg/(m^2) as calculated from the following:    Height as of this encounter: 5' 8\" (1.727 m).    Weight as of this encounter: 165 lb 12.8 oz (75.2 kg).  Medication Reconciliation: complete     Lala Mckeon MA  "

## 2018-12-07 NOTE — PATIENT INSTRUCTIONS
"Benzos are not the answer to anxiety treatment, we can use them very briefly until lthe buspar starts working.  Please schedule with a therapist and psychiatrist. Follow up in one month for medication check, sooner if needed.   Mental Health Crisis Numbers  [facility]  If you have a mental health or substance abuse crisis on a weekend or after hours, please use any of the resources below.  General numbers  911 emergency services  211 First Call for Help  [facility]  [address] [place] [address] [phone]  Crisis Connection Hotline  600.761.4337 or 1-785.265.1659  National Suicide Prevention Lifeline  0-256-965-TALK (4532)  ZKD1LNCC  Text crisis line for teens. Text \"LIFE\" to 132405  [facility] mobile crisis services  These services will come to you. Call the county where the child is physically located.    [place] (adult only): [phone]    [place] (child and adult): [phone]    [place] (child and adult): [phone]    [place] (child): [phone]    [place] (adult): [phone]    [place] (child): [phone]    [place] (Adult): [phone]    [place] (child and adult): [phone]    [place] (child and adult): [phone] or [phone]  Other crisis resources (not mobile)  [facility]  6-658-621-9501  [place] Youth Crisis Hotline  539.436.4104 or 1-913.427.8239  Acute Psychiatric Services (formerly known as the Crisis Intervention Center)  Offers walk-in and telephone crisis intervention services for adults.  [facility]  [address] [place] [address] [phone] or [phone] (suicide hotline)  Short-term residential crisis resources  [facility] (ages 10 to 17)  [address] [place] [address] [phone]  Suggested inpatient hospitalization "   [facility], [address] [address]  [phone]  For informational purposes only. Not to replace the advice of your health care provider.  Copyright   2014 [employer] [facility]. All rights reserved. [employer] [id number] - REV 09/15.

## 2018-12-10 NOTE — TELEPHONE ENCOUNTER
Patient states that is is still shaky and would like you to increase the dose of his clonazepam to 1 to 1/2.    Thank you.

## 2018-12-10 NOTE — TELEPHONE ENCOUNTER
Patient informed that provider is out of office.  Per patient okay to wait for provider to return tomorrow. Wants to increase med to 1 1/2 tabs

## 2018-12-11 NOTE — TELEPHONE ENCOUNTER
He is already able to take 1-2 tablets.  Is he still taking the buspar?  If he is I will send in one more RX for clonazepam, however this is not an ongoing medication. LIAN Leigh, FNP-BC

## 2018-12-11 NOTE — TELEPHONE ENCOUNTER
"Spoke with patient.     Patient states he is taking the buspar, today he increased to 7.5mg. Patient does not feel this is \"totally\" working yet.     Patient would like refill on clonazepam to help until the buspar \"totally\" takes effect.     Patient requesting to  from [facility].    Rosalie Burt RN, BSN, PHN    "